# Patient Record
Sex: FEMALE | Race: WHITE | NOT HISPANIC OR LATINO | Employment: FULL TIME
[De-identification: names, ages, dates, MRNs, and addresses within clinical notes are randomized per-mention and may not be internally consistent; named-entity substitution may affect disease eponyms.]

---

## 2018-02-23 ENCOUNTER — HOSPITAL (OUTPATIENT)
Dept: OTHER | Age: 58
End: 2018-02-23
Attending: NURSE PRACTITIONER

## 2018-03-13 ENCOUNTER — HOSPITAL (OUTPATIENT)
Dept: OTHER | Age: 58
End: 2018-03-13
Attending: NURSE PRACTITIONER

## 2018-08-31 ENCOUNTER — HOSPITAL (OUTPATIENT)
Dept: OTHER | Age: 58
End: 2018-08-31
Attending: NURSE PRACTITIONER

## 2019-03-01 ENCOUNTER — HOSPITAL (OUTPATIENT)
Dept: OTHER | Age: 59
End: 2019-03-01
Attending: NURSE PRACTITIONER

## 2019-05-18 ENCOUNTER — HOSPITAL (OUTPATIENT)
Dept: OTHER | Age: 59
End: 2019-05-18

## 2019-05-21 LAB — PATHOLOGIST NAME: NORMAL

## 2019-09-11 ENCOUNTER — HOSPITAL (OUTPATIENT)
Dept: OTHER | Age: 59
End: 2019-09-11
Attending: FAMILY MEDICINE

## 2020-01-09 DIAGNOSIS — Z13.6 ENCOUNTER FOR SCREENING FOR CARDIOVASCULAR DISORDERS: Primary | ICD-10-CM

## 2020-01-09 DIAGNOSIS — Z13.6 ENCOUNTER FOR LIPID SCREENING FOR CARDIOVASCULAR DISEASE: ICD-10-CM

## 2020-01-09 DIAGNOSIS — Z13.220 ENCOUNTER FOR LIPID SCREENING FOR CARDIOVASCULAR DISEASE: ICD-10-CM

## 2020-01-18 DIAGNOSIS — R92.30 DENSE BREAST: Primary | ICD-10-CM

## 2020-01-18 DIAGNOSIS — Z12.31 VISIT FOR SCREENING MAMMOGRAM: ICD-10-CM

## 2020-01-18 DIAGNOSIS — R92.2 DENSE BREAST: Primary | ICD-10-CM

## 2020-01-28 ENCOUNTER — APPOINTMENT (OUTPATIENT)
Dept: CT IMAGING | Age: 60
End: 2020-01-28
Attending: FAMILY MEDICINE

## 2020-03-03 ENCOUNTER — HOSPITAL ENCOUNTER (OUTPATIENT)
Dept: ULTRASOUND IMAGING | Age: 60
Discharge: HOME | End: 2020-03-03

## 2020-03-03 ENCOUNTER — HOSPITAL ENCOUNTER (OUTPATIENT)
Dept: MAMMOGRAPHY | Age: 60
Discharge: HOME | End: 2020-03-03

## 2020-03-03 DIAGNOSIS — Z12.31 VISIT FOR SCREENING MAMMOGRAM: ICD-10-CM

## 2020-03-03 DIAGNOSIS — R92.30 DENSE BREAST: ICD-10-CM

## 2020-03-03 DIAGNOSIS — R92.2 DENSE BREAST: ICD-10-CM

## 2020-03-03 PROCEDURE — 76641 ULTRASOUND BREAST COMPLETE: CPT

## 2020-03-03 PROCEDURE — 77063 BREAST TOMOSYNTHESIS BI: CPT

## 2020-06-08 ENCOUNTER — APPOINTMENT (OUTPATIENT)
Dept: CT IMAGING | Age: 60
End: 2020-06-08
Attending: FAMILY MEDICINE

## 2021-01-07 DIAGNOSIS — R92.2 DENSE BREASTS: ICD-10-CM

## 2021-01-07 DIAGNOSIS — R92.30 DENSE BREASTS: ICD-10-CM

## 2021-01-07 DIAGNOSIS — Z12.31 ENCOUNTER FOR SCREENING MAMMOGRAM FOR MALIGNANT NEOPLASM OF BREAST: Primary | ICD-10-CM

## 2021-02-26 ENCOUNTER — HOSPITAL ENCOUNTER (OUTPATIENT)
Dept: MAMMOGRAPHY | Age: 61
Discharge: HOME | End: 2021-02-26
Attending: NURSE PRACTITIONER

## 2021-02-26 ENCOUNTER — HOSPITAL ENCOUNTER (OUTPATIENT)
Dept: ULTRASOUND IMAGING | Age: 61
Discharge: HOME | End: 2021-02-26
Attending: NURSE PRACTITIONER

## 2021-02-26 DIAGNOSIS — R92.2 DENSE BREASTS: ICD-10-CM

## 2021-02-26 DIAGNOSIS — Z12.31 ENCOUNTER FOR SCREENING MAMMOGRAM FOR MALIGNANT NEOPLASM OF BREAST: ICD-10-CM

## 2021-02-26 DIAGNOSIS — R92.30 DENSE BREASTS: ICD-10-CM

## 2021-02-26 PROCEDURE — 77063 BREAST TOMOSYNTHESIS BI: CPT

## 2021-02-26 PROCEDURE — 76641 ULTRASOUND BREAST COMPLETE: CPT

## 2021-11-08 ENCOUNTER — EXTERNAL RECORD (OUTPATIENT)
Dept: HEALTH INFORMATION MANAGEMENT | Facility: OTHER | Age: 61
End: 2021-11-08

## 2021-12-30 DIAGNOSIS — R92.2 DENSE BREASTS: Primary | ICD-10-CM

## 2021-12-30 DIAGNOSIS — Z12.31 BREAST CANCER SCREENING BY MAMMOGRAM: Primary | ICD-10-CM

## 2021-12-30 DIAGNOSIS — R92.30 DENSE BREASTS: Primary | ICD-10-CM

## 2022-02-25 ENCOUNTER — HOSPITAL ENCOUNTER (OUTPATIENT)
Dept: ULTRASOUND IMAGING | Age: 62
Discharge: HOME | End: 2022-02-25
Attending: NURSE PRACTITIONER

## 2022-02-25 ENCOUNTER — HOSPITAL ENCOUNTER (OUTPATIENT)
Dept: MAMMOGRAPHY | Age: 62
Discharge: HOME | End: 2022-02-25
Attending: NURSE PRACTITIONER

## 2022-02-25 DIAGNOSIS — R92.2 DENSE BREASTS: ICD-10-CM

## 2022-02-25 DIAGNOSIS — R92.30 DENSE BREASTS: ICD-10-CM

## 2022-02-25 DIAGNOSIS — Z12.31 BREAST CANCER SCREENING BY MAMMOGRAM: ICD-10-CM

## 2022-02-25 PROCEDURE — 76641 ULTRASOUND BREAST COMPLETE: CPT

## 2022-02-25 PROCEDURE — 77063 BREAST TOMOSYNTHESIS BI: CPT

## 2022-04-15 ENCOUNTER — TELEPHONE (OUTPATIENT)
Dept: SCHEDULING | Age: 62
End: 2022-04-15

## 2022-04-15 DIAGNOSIS — Z13.6 SCREENING FOR ISCHEMIC HEART DISEASE: Primary | ICD-10-CM

## 2022-07-06 ENCOUNTER — APPOINTMENT (OUTPATIENT)
Dept: CT IMAGING | Age: 62
End: 2022-07-06

## 2022-07-26 ENCOUNTER — OFFICE VISIT (OUTPATIENT)
Dept: OBGYN | Age: 62
End: 2022-07-26

## 2022-07-26 VITALS
SYSTOLIC BLOOD PRESSURE: 120 MMHG | DIASTOLIC BLOOD PRESSURE: 80 MMHG | HEIGHT: 60 IN | WEIGHT: 165 LBS | BODY MASS INDEX: 32.39 KG/M2

## 2022-07-26 DIAGNOSIS — Z01.419 GYNECOLOGIC EXAM NORMAL: Primary | ICD-10-CM

## 2022-07-26 PROCEDURE — 99396 PREV VISIT EST AGE 40-64: CPT | Performed by: NURSE PRACTITIONER

## 2022-07-26 PROCEDURE — 87624 HPV HI-RISK TYP POOLED RSLT: CPT | Performed by: INTERNAL MEDICINE

## 2022-07-26 PROCEDURE — 88175 CYTOPATH C/V AUTO FLUID REDO: CPT | Performed by: INTERNAL MEDICINE

## 2022-07-26 RX ORDER — ATORVASTATIN CALCIUM 10 MG/1
10 TABLET, FILM COATED ORAL DAILY
COMMUNITY
Start: 2010-01-01 | End: 2023-08-21 | Stop reason: DRUGHIGH

## 2022-07-26 RX ORDER — METFORMIN HYDROCHLORIDE 500 MG/1
500 TABLET, EXTENDED RELEASE ORAL DAILY
COMMUNITY
Start: 2022-07-09 | End: 2023-08-21 | Stop reason: SDUPTHER

## 2022-07-26 RX ORDER — LEVOTHYROXINE SODIUM 88 UG/1
TABLET ORAL
COMMUNITY
Start: 2022-07-02 | End: 2023-06-08

## 2022-07-26 RX ORDER — ATORVASTATIN CALCIUM 10 MG/1
10 TABLET, FILM COATED ORAL DAILY
COMMUNITY
Start: 2022-05-19 | End: 2022-07-26 | Stop reason: CLARIF

## 2022-07-26 RX ORDER — HYDROCHLOROTHIAZIDE 12.5 MG/1
CAPSULE, GELATIN COATED ORAL
COMMUNITY
Start: 2022-05-20 | End: 2023-08-21 | Stop reason: ALTCHOICE

## 2022-07-26 RX ORDER — VITAMIN B COMPLEX
TABLET ORAL DAILY
COMMUNITY
End: 2023-08-21 | Stop reason: ALTCHOICE

## 2022-07-26 ASSESSMENT — ENCOUNTER SYMPTOMS
ABDOMINAL PAIN: 0
EYES NEGATIVE: 1
ADENOPATHY: 0
ACTIVITY CHANGE: 0
CONSTIPATION: 0
DIARRHEA: 0
CHILLS: 0
APPETITE CHANGE: 0
DIAPHORESIS: 0
COUGH: 0
NAUSEA: 0
DIZZINESS: 0
FEVER: 0
COLOR CHANGE: 0
BLOOD IN STOOL: 0
UNEXPECTED WEIGHT CHANGE: 0
ABDOMINAL DISTENTION: 0
VOMITING: 0
HEADACHES: 0
LIGHT-HEADEDNESS: 0
SHORTNESS OF BREATH: 0
FATIGUE: 0
PSYCHIATRIC NEGATIVE: 1

## 2022-08-01 LAB
CASE RPRT: NORMAL
CLINICAL INFO: NORMAL
CYTOLOGY CVX/VAG STUDY: NORMAL
HPV16+18+45 E6+E7MRNA CVX NAA+PROBE: NEGATIVE
Lab: NORMAL
PAP EDUCATIONAL NOTE: NORMAL
SPECIMEN ADEQUACY: NORMAL

## 2023-03-06 ENCOUNTER — TELEPHONE (OUTPATIENT)
Dept: OBGYN | Age: 63
End: 2023-03-06

## 2023-03-06 DIAGNOSIS — Z12.31 ENCOUNTER FOR SCREENING MAMMOGRAM FOR MALIGNANT NEOPLASM OF BREAST: ICD-10-CM

## 2023-03-06 DIAGNOSIS — R92.30 DENSE BREASTS: ICD-10-CM

## 2023-03-06 DIAGNOSIS — Z12.39 BREAST SCREENING: Primary | ICD-10-CM

## 2023-03-06 DIAGNOSIS — R92.2 DENSE BREASTS: ICD-10-CM

## 2023-04-20 ENCOUNTER — HOSPITAL ENCOUNTER (OUTPATIENT)
Dept: MAMMOGRAPHY | Age: 63
Discharge: HOME | End: 2023-04-20
Attending: NURSE PRACTITIONER

## 2023-04-20 ENCOUNTER — HOSPITAL ENCOUNTER (OUTPATIENT)
Dept: ULTRASOUND IMAGING | Age: 63
Discharge: HOME | End: 2023-04-20
Attending: NURSE PRACTITIONER

## 2023-04-20 DIAGNOSIS — Z12.31 ENCOUNTER FOR SCREENING MAMMOGRAM FOR MALIGNANT NEOPLASM OF BREAST: ICD-10-CM

## 2023-04-20 DIAGNOSIS — Z12.39 BREAST SCREENING: ICD-10-CM

## 2023-04-20 DIAGNOSIS — R92.2 DENSE BREASTS: ICD-10-CM

## 2023-04-20 DIAGNOSIS — R92.30 DENSE BREASTS: ICD-10-CM

## 2023-04-20 PROCEDURE — 77063 BREAST TOMOSYNTHESIS BI: CPT

## 2023-04-20 PROCEDURE — 76641 ULTRASOUND BREAST COMPLETE: CPT

## 2023-04-21 ENCOUNTER — TELEPHONE (OUTPATIENT)
Dept: OBGYN | Age: 63
End: 2023-04-21

## 2023-06-19 ENCOUNTER — ANESTHESIA EVENT (OUTPATIENT)
Dept: GASTROENTEROLOGY | Age: 63
End: 2023-06-19

## 2023-06-19 ENCOUNTER — HOSPITAL ENCOUNTER (OUTPATIENT)
Dept: GASTROENTEROLOGY | Age: 63
Discharge: HOME | End: 2023-06-19
Attending: INTERNAL MEDICINE

## 2023-06-19 ENCOUNTER — ANESTHESIA (OUTPATIENT)
Dept: GASTROENTEROLOGY | Age: 63
End: 2023-06-19

## 2023-06-19 VITALS
WEIGHT: 162 LBS | RESPIRATION RATE: 17 BRPM | TEMPERATURE: 97.5 F | BODY MASS INDEX: 31.8 KG/M2 | HEIGHT: 60 IN | OXYGEN SATURATION: 99 % | SYSTOLIC BLOOD PRESSURE: 120 MMHG | DIASTOLIC BLOOD PRESSURE: 73 MMHG | HEART RATE: 75 BPM

## 2023-06-19 DIAGNOSIS — Z86.010 PERSONAL HISTORY OF COLONIC POLYPS: ICD-10-CM

## 2023-06-19 LAB — GLUCOSE BLDC GLUCOMTR-MCNC: 103 MG/DL (ref 70–99)

## 2023-06-19 PROCEDURE — 10002800 HB RX 250 W HCPCS: Performed by: ANESTHESIOLOGY

## 2023-06-19 PROCEDURE — 10002801 HB RX 250 W/O HCPCS: Performed by: ANESTHESIOLOGY

## 2023-06-19 PROCEDURE — 10002807 HB RX 258: Performed by: ANESTHESIOLOGY

## 2023-06-19 PROCEDURE — 13000001 HB PHASE II RECOVERY EA 30 MINUTES

## 2023-06-19 PROCEDURE — 13000004 HB  ANESTHESIA  GENERAL OUTSIDE OR

## 2023-06-19 PROCEDURE — 82962 GLUCOSE BLOOD TEST: CPT

## 2023-06-19 PROCEDURE — 13000024 HB GI COMPLEX CASE S/U + 1ST 15 MIN

## 2023-06-19 PROCEDURE — 10002807 HB RX 258: Performed by: INTERNAL MEDICINE

## 2023-06-19 RX ORDER — DEXTROSE AND SODIUM CHLORIDE 5; .45 G/100ML; G/100ML
INJECTION, SOLUTION INTRAVENOUS CONTINUOUS
Status: DISCONTINUED | OUTPATIENT
Start: 2023-06-19 | End: 2023-06-19 | Stop reason: HOSPADM

## 2023-06-19 RX ORDER — SODIUM CHLORIDE, SODIUM LACTATE, POTASSIUM CHLORIDE, CALCIUM CHLORIDE 600; 310; 30; 20 MG/100ML; MG/100ML; MG/100ML; MG/100ML
INJECTION, SOLUTION INTRAVENOUS CONTINUOUS
Status: DISCONTINUED | OUTPATIENT
Start: 2023-06-19 | End: 2023-06-19 | Stop reason: HOSPADM

## 2023-06-19 RX ORDER — SODIUM CHLORIDE, SODIUM LACTATE, POTASSIUM CHLORIDE, CALCIUM CHLORIDE 600; 310; 30; 20 MG/100ML; MG/100ML; MG/100ML; MG/100ML
INJECTION, SOLUTION INTRAVENOUS CONTINUOUS PRN
Status: DISCONTINUED | OUTPATIENT
Start: 2023-06-19 | End: 2023-06-19

## 2023-06-19 RX ORDER — PROPOFOL 10 MG/ML
INJECTION, EMULSION INTRAVENOUS PRN
Status: DISCONTINUED | OUTPATIENT
Start: 2023-06-19 | End: 2023-06-19

## 2023-06-19 RX ORDER — LIDOCAINE HYDROCHLORIDE 10 MG/ML
INJECTION, SOLUTION EPIDURAL; INFILTRATION; INTRACAUDAL; PERINEURAL PRN
Status: DISCONTINUED | OUTPATIENT
Start: 2023-06-19 | End: 2023-06-19

## 2023-06-19 RX ORDER — LEVOTHYROXINE SODIUM 0.07 MG/1
75 TABLET ORAL
Status: SHIPPED | COMMUNITY
Start: 2023-06-19 | End: 2023-08-21 | Stop reason: SDUPTHER

## 2023-06-19 RX ORDER — SODIUM CHLORIDE, SODIUM LACTATE, POTASSIUM CHLORIDE, CALCIUM CHLORIDE 600; 310; 30; 20 MG/100ML; MG/100ML; MG/100ML; MG/100ML
INJECTION, SOLUTION INTRAVENOUS ONCE
Status: COMPLETED | OUTPATIENT
Start: 2023-06-19 | End: 2023-06-19

## 2023-06-19 RX ORDER — DEXTROSE MONOHYDRATE 25 G/50ML
25 INJECTION, SOLUTION INTRAVENOUS PRN
Status: DISCONTINUED | OUTPATIENT
Start: 2023-06-19 | End: 2023-06-19 | Stop reason: HOSPADM

## 2023-06-19 RX ADMIN — SODIUM CHLORIDE, POTASSIUM CHLORIDE, SODIUM LACTATE AND CALCIUM CHLORIDE: 600; 310; 30; 20 INJECTION, SOLUTION INTRAVENOUS at 09:25

## 2023-06-19 RX ADMIN — PROPOFOL 150 MG: 10 INJECTION, EMULSION INTRAVENOUS at 09:58

## 2023-06-19 RX ADMIN — LIDOCAINE HYDROCHLORIDE 25 MG: 10 INJECTION, SOLUTION EPIDURAL; INFILTRATION; INTRACAUDAL; PERINEURAL at 09:58

## 2023-06-19 RX ADMIN — PROPOFOL 140 MCG/KG/MIN: 10 INJECTION, EMULSION INTRAVENOUS at 09:58

## 2023-06-19 RX ADMIN — SODIUM CHLORIDE, POTASSIUM CHLORIDE, SODIUM LACTATE AND CALCIUM CHLORIDE: 600; 310; 30; 20 INJECTION, SOLUTION INTRAVENOUS at 09:49

## 2023-06-19 ASSESSMENT — PAIN SCALES - GENERAL
PAINLEVEL_OUTOF10: 0

## 2023-08-21 PROBLEM — Z72.0 TOBACCO USE: Status: ACTIVE | Noted: 2023-08-21

## 2023-08-21 PROBLEM — R73.01 IMPAIRED FASTING GLUCOSE: Status: ACTIVE | Noted: 2019-04-15

## 2023-08-25 ENCOUNTER — OFFICE VISIT (OUTPATIENT)
Dept: OBGYN | Age: 63
End: 2023-08-25

## 2023-08-25 VITALS
HEIGHT: 61 IN | WEIGHT: 165 LBS | BODY MASS INDEX: 31.15 KG/M2 | DIASTOLIC BLOOD PRESSURE: 80 MMHG | SYSTOLIC BLOOD PRESSURE: 150 MMHG

## 2023-08-25 DIAGNOSIS — Z01.419 GYNECOLOGIC EXAM NORMAL: Primary | ICD-10-CM

## 2023-08-25 PROCEDURE — 88175 CYTOPATH C/V AUTO FLUID REDO: CPT | Performed by: INTERNAL MEDICINE

## 2023-08-25 PROCEDURE — 3079F DIAST BP 80-89 MM HG: CPT | Performed by: NURSE PRACTITIONER

## 2023-08-25 PROCEDURE — 3077F SYST BP >= 140 MM HG: CPT | Performed by: NURSE PRACTITIONER

## 2023-08-25 PROCEDURE — 87624 HPV HI-RISK TYP POOLED RSLT: CPT | Performed by: INTERNAL MEDICINE

## 2023-08-25 PROCEDURE — 99396 PREV VISIT EST AGE 40-64: CPT | Performed by: NURSE PRACTITIONER

## 2023-08-25 ASSESSMENT — ENCOUNTER SYMPTOMS
CHILLS: 0
COLOR CHANGE: 0
VOMITING: 0
SHORTNESS OF BREATH: 0
FEVER: 0
ACTIVITY CHANGE: 0
DIAPHORESIS: 0
DIZZINESS: 0
NAUSEA: 0
HEADACHES: 0
EYES NEGATIVE: 1
UNEXPECTED WEIGHT CHANGE: 0
PSYCHIATRIC NEGATIVE: 1
ABDOMINAL PAIN: 0
APPETITE CHANGE: 0
CONSTIPATION: 0
ABDOMINAL DISTENTION: 0
FATIGUE: 0
ADENOPATHY: 0
COUGH: 0
BLOOD IN STOOL: 0
DIARRHEA: 0
LIGHT-HEADEDNESS: 0

## 2024-02-21 ENCOUNTER — HOSPITAL ENCOUNTER (OUTPATIENT)
Dept: ULTRASOUND IMAGING | Age: 64
Discharge: HOME | End: 2024-02-21
Attending: FAMILY MEDICINE

## 2024-02-21 DIAGNOSIS — R10.9 ABDOMINAL PAIN, UNSPECIFIED ABDOMINAL LOCATION: ICD-10-CM

## 2024-02-21 PROBLEM — R11.14 BILIOUS VOMITING WITHOUT NAUSEA: Status: ACTIVE | Noted: 2024-02-21

## 2024-02-21 PROBLEM — R10.30 LOWER ABDOMINAL PAIN: Status: ACTIVE | Noted: 2024-02-21

## 2024-02-21 PROCEDURE — 76700 US EXAM ABDOM COMPLETE: CPT

## 2024-03-12 PROBLEM — R11.14 BILIOUS VOMITING WITHOUT NAUSEA: Status: RESOLVED | Noted: 2024-02-21 | Resolved: 2024-03-12

## 2024-03-21 ENCOUNTER — TELEPHONE (OUTPATIENT)
Dept: OBGYN | Age: 64
End: 2024-03-21

## 2024-03-21 DIAGNOSIS — Z12.31 ENCOUNTER FOR SCREENING MAMMOGRAM FOR MALIGNANT NEOPLASM OF BREAST: Primary | ICD-10-CM

## 2024-03-21 DIAGNOSIS — Z12.39 ENCOUNTER FOR BREAST CANCER SCREENING USING NON-MAMMOGRAM MODALITY: ICD-10-CM

## 2024-03-21 DIAGNOSIS — R92.30 DENSE BREASTS: ICD-10-CM

## 2024-03-21 DIAGNOSIS — R92.2 DENSE BREASTS: ICD-10-CM

## 2024-05-07 ENCOUNTER — HOSPITAL ENCOUNTER (OUTPATIENT)
Dept: MAMMOGRAPHY | Age: 64
Discharge: HOME | End: 2024-05-07
Attending: NURSE PRACTITIONER

## 2024-05-07 ENCOUNTER — HOSPITAL ENCOUNTER (OUTPATIENT)
Dept: ULTRASOUND IMAGING | Age: 64
Discharge: HOME | End: 2024-05-07
Attending: NURSE PRACTITIONER

## 2024-05-07 DIAGNOSIS — R92.30 DENSE BREASTS: ICD-10-CM

## 2024-05-07 DIAGNOSIS — Z12.31 ENCOUNTER FOR SCREENING MAMMOGRAM FOR MALIGNANT NEOPLASM OF BREAST: ICD-10-CM

## 2024-05-07 DIAGNOSIS — Z12.39 ENCOUNTER FOR BREAST CANCER SCREENING USING NON-MAMMOGRAM MODALITY: ICD-10-CM

## 2024-05-07 PROCEDURE — 76641 ULTRASOUND BREAST COMPLETE: CPT

## 2024-05-07 PROCEDURE — 77067 SCR MAMMO BI INCL CAD: CPT

## 2024-05-07 PROCEDURE — 77063 BREAST TOMOSYNTHESIS BI: CPT

## 2024-08-19 ENCOUNTER — TELEPHONE (OUTPATIENT)
Dept: ONCOLOGY | Age: 64
End: 2024-08-19

## 2024-08-27 ENCOUNTER — APPOINTMENT (OUTPATIENT)
Dept: OBGYN | Age: 64
End: 2024-08-27

## 2024-08-27 VITALS
BODY MASS INDEX: 30.21 KG/M2 | SYSTOLIC BLOOD PRESSURE: 130 MMHG | DIASTOLIC BLOOD PRESSURE: 70 MMHG | WEIGHT: 160 LBS | HEIGHT: 61 IN

## 2024-08-27 DIAGNOSIS — Z01.419 GYNECOLOGIC EXAM NORMAL: Primary | ICD-10-CM

## 2024-08-27 PROCEDURE — 3075F SYST BP GE 130 - 139MM HG: CPT | Performed by: NURSE PRACTITIONER

## 2024-08-27 PROCEDURE — 99396 PREV VISIT EST AGE 40-64: CPT | Performed by: NURSE PRACTITIONER

## 2024-08-27 PROCEDURE — 3078F DIAST BP <80 MM HG: CPT | Performed by: NURSE PRACTITIONER

## 2024-08-27 ASSESSMENT — ENCOUNTER SYMPTOMS
FEVER: 0
COLOR CHANGE: 0
FATIGUE: 0
COUGH: 0
ABDOMINAL DISTENTION: 0
APPETITE CHANGE: 0
PSYCHIATRIC NEGATIVE: 1
ADENOPATHY: 0
CONSTIPATION: 0
NAUSEA: 0
ABDOMINAL PAIN: 0
EYES NEGATIVE: 1
CHILLS: 0
SHORTNESS OF BREATH: 0
ACTIVITY CHANGE: 0
VOMITING: 0
UNEXPECTED WEIGHT CHANGE: 0
DIAPHORESIS: 0
BLOOD IN STOOL: 0
HEADACHES: 0
LIGHT-HEADEDNESS: 0
DIZZINESS: 0
DIARRHEA: 0

## 2024-08-28 ENCOUNTER — HOSPITAL ENCOUNTER (OUTPATIENT)
Dept: CT IMAGING | Age: 64
Discharge: HOME | End: 2024-08-28
Attending: FAMILY MEDICINE

## 2024-08-28 DIAGNOSIS — F17.200 SMOKER: ICD-10-CM

## 2024-08-28 PROCEDURE — 71271 CT THORAX LUNG CANCER SCR C-: CPT

## 2024-09-03 PROBLEM — Z00.00 GENERAL MEDICAL EXAM: Status: ACTIVE | Noted: 2024-09-03

## 2024-11-12 PROBLEM — Z82.49 FAMILY HX OF AORTIC ANEURYSM: Status: ACTIVE | Noted: 2024-11-12

## 2024-11-12 PROBLEM — D49.2 NEOPLASM OF SKIN: Status: ACTIVE | Noted: 2024-11-12

## 2024-11-12 PROBLEM — S46.911A STRAIN OF RIGHT SHOULDER: Status: ACTIVE | Noted: 2024-11-12

## 2024-11-26 PROBLEM — M62.830 SPASM OF LEFT TRAPEZIUS MUSCLE: Status: ACTIVE | Noted: 2024-11-26

## 2024-11-26 PROBLEM — S46.912A STRAIN OF LEFT SHOULDER: Status: ACTIVE | Noted: 2024-11-12

## 2024-12-03 ENCOUNTER — APPOINTMENT (OUTPATIENT)
Dept: CT IMAGING | Age: 64
End: 2024-12-03
Attending: FAMILY MEDICINE

## 2024-12-03 DIAGNOSIS — Z13.6 SCREENING FOR CARDIOVASCULAR CONDITION: ICD-10-CM

## 2024-12-03 PROCEDURE — 75571 CT HRT W/O DYE W/CA TEST: CPT

## 2024-12-06 ENCOUNTER — APPOINTMENT (OUTPATIENT)
Dept: URBAN - METROPOLITAN AREA CLINIC 240 | Age: 64
Setting detail: DERMATOLOGY
End: 2024-12-06

## 2024-12-06 DIAGNOSIS — L82.1 OTHER SEBORRHEIC KERATOSIS: ICD-10-CM

## 2024-12-06 DIAGNOSIS — D49.2 NEOPLASM OF UNSPECIFIED BEHAVIOR OF BONE, SOFT TISSUE, AND SKIN: ICD-10-CM

## 2024-12-06 DIAGNOSIS — D22 MELANOCYTIC NEVI: ICD-10-CM

## 2024-12-06 DIAGNOSIS — D18.0 HEMANGIOMA: ICD-10-CM

## 2024-12-06 DIAGNOSIS — L81.4 OTHER MELANIN HYPERPIGMENTATION: ICD-10-CM

## 2024-12-06 DIAGNOSIS — Z71.89 OTHER SPECIFIED COUNSELING: ICD-10-CM

## 2024-12-06 PROBLEM — D22.5 MELANOCYTIC NEVI OF TRUNK: Status: ACTIVE | Noted: 2024-12-06

## 2024-12-06 PROBLEM — D22.71 MELANOCYTIC NEVI OF RIGHT LOWER LIMB, INCLUDING HIP: Status: ACTIVE | Noted: 2024-12-06

## 2024-12-06 PROBLEM — D18.01 HEMANGIOMA OF SKIN AND SUBCUTANEOUS TISSUE: Status: ACTIVE | Noted: 2024-12-06

## 2024-12-06 PROCEDURE — OTHER MIPS QUALITY: OTHER

## 2024-12-06 PROCEDURE — OTHER PHOTO-DOCUMENTATION: OTHER

## 2024-12-06 PROCEDURE — 99203 OFFICE O/P NEW LOW 30 MIN: CPT | Mod: 25

## 2024-12-06 PROCEDURE — OTHER BIOPSY BY SHAVE METHOD: OTHER

## 2024-12-06 PROCEDURE — 11102 TANGNTL BX SKIN SINGLE LES: CPT

## 2024-12-06 PROCEDURE — OTHER COUNSELING: OTHER

## 2024-12-06 ASSESSMENT — LOCATION DETAILED DESCRIPTION DERM
LOCATION DETAILED: LEFT SUPERIOR UPPER BACK
LOCATION DETAILED: RIGHT LATERAL SUPERIOR CHEST
LOCATION DETAILED: SUPERIOR LUMBAR SPINE
LOCATION DETAILED: RIGHT LATERAL ABDOMEN
LOCATION DETAILED: LEFT MEDIAL BREAST 10-11:00 REGION
LOCATION DETAILED: RIGHT MID-UPPER BACK
LOCATION DETAILED: LEFT LATERAL ABDOMEN
LOCATION DETAILED: EPIGASTRIC SKIN
LOCATION DETAILED: RIGHT ANTERIOR DISTAL THIGH

## 2024-12-06 ASSESSMENT — LOCATION SIMPLE DESCRIPTION DERM
LOCATION SIMPLE: CHEST
LOCATION SIMPLE: LEFT BREAST
LOCATION SIMPLE: LOWER BACK
LOCATION SIMPLE: RIGHT UPPER BACK
LOCATION SIMPLE: LEFT UPPER BACK
LOCATION SIMPLE: ABDOMEN
LOCATION SIMPLE: RIGHT THIGH

## 2024-12-06 ASSESSMENT — LOCATION ZONE DERM
LOCATION ZONE: TRUNK
LOCATION ZONE: LEG

## 2024-12-06 NOTE — PROCEDURE: BIOPSY BY SHAVE METHOD
Detail Level: Detailed
Depth Of Biopsy: dermis
Was A Bandage Applied: Yes
Size Of Lesion In Cm: 0.4
X Size Of Lesion In Cm: 0
Biopsy Type: H and E
Biopsy Method: Dermablade
Anesthesia Type: 1% lidocaine with epinephrine
Anesthesia Volume In Cc: 0.5
Hemostasis: Drysol
Wound Care: Petrolatum
Dressing: bandage
Destruction After The Procedure: No
Type Of Destruction Used: Curettage
Curettage Text: The wound bed was treated with curettage after the biopsy was performed.
Cryotherapy Text: The wound bed was treated with cryotherapy after the biopsy was performed.
Electrodesiccation Text: The wound bed was treated with electrodesiccation after the biopsy was performed.
Electrodesiccation And Curettage Text: The wound bed was treated with electrodesiccation and curettage after the biopsy was performed.
Silver Nitrate Text: The wound bed was treated with silver nitrate after the biopsy was performed.
Lab: [id number]
Medical Necessity Information: It is in your best interest to select a reason for this procedure from the list below. All of these items fulfill various CMS LCD requirements except the new and changing color options.
Consent: Written consent was obtained and risks were reviewed including but not limited to scarring, infection, bleeding, scabbing, incomplete removal, nerve damage and allergy to anesthesia.
Post-Care Instructions: I reviewed with the patient in detail post-care instructions. Patient is to keep the biopsy site dry overnight, and then apply bacitracin twice daily until healed. Patient may apply hydrogen peroxide soaks to remove any crusting.
Notification Instructions: Patient will be notified of biopsy results. However, patient instructed to call the office if not contacted within 2 weeks.
Billing Type: Third-Party Bill
Information: Selecting Yes will display possible errors in your note based on the variables you have selected. This validation is only offered as a suggestion for you. PLEASE NOTE THAT THE VALIDATION TEXT WILL BE REMOVED WHEN YOU FINALIZE YOUR NOTE. IF YOU WANT TO FAX A PRELIMINARY NOTE YOU WILL NEED TO TOGGLE THIS TO 'NO' IF YOU DO NOT WANT IT IN YOUR FAXED NOTE.

## 2024-12-06 NOTE — HPI: FULL BODY SKIN EXAMINATION
What Type Of Note Output Would You Prefer (Optional)?: Bullet Format
What Is The Reason For Today's Visit?: Full Body Skin Examination
What Is The Reason For Today's Visit? (Being Monitored For X): concerning skin lesions on an annual basis
Additional History: Reports mole on L chest getting scratched and currently is enlarging. Would like evaluation. Hx of mole bx, benign.

## 2024-12-18 ENCOUNTER — APPOINTMENT (OUTPATIENT)
Dept: ULTRASOUND IMAGING | Age: 64
End: 2024-12-18

## 2025-03-20 PROBLEM — I25.83 CORONARY ARTERY DISEASE DUE TO LIPID RICH PLAQUE: Status: ACTIVE | Noted: 2025-03-20

## 2025-03-20 PROBLEM — I25.10 CORONARY ARTERY DISEASE DUE TO LIPID RICH PLAQUE: Status: ACTIVE | Noted: 2025-03-20

## 2025-04-08 ENCOUNTER — APPOINTMENT (OUTPATIENT)
Dept: CARDIOLOGY | Age: 65
End: 2025-04-08
Attending: FAMILY MEDICINE

## 2025-04-08 VITALS
WEIGHT: 159.28 LBS | SYSTOLIC BLOOD PRESSURE: 122 MMHG | HEART RATE: 66 BPM | HEIGHT: 61 IN | BODY MASS INDEX: 30.07 KG/M2 | DIASTOLIC BLOOD PRESSURE: 74 MMHG

## 2025-04-08 DIAGNOSIS — I10 BENIGN ESSENTIAL HYPERTENSION: ICD-10-CM

## 2025-04-08 DIAGNOSIS — I25.10 CORONARY ARTERY DISEASE DUE TO LIPID RICH PLAQUE: ICD-10-CM

## 2025-04-08 DIAGNOSIS — I25.83 CORONARY ARTERY DISEASE DUE TO LIPID RICH PLAQUE: ICD-10-CM

## 2025-04-08 DIAGNOSIS — Z82.49 FAMILY HX OF AORTIC ANEURYSM: ICD-10-CM

## 2025-04-08 DIAGNOSIS — E78.00 PURE HYPERCHOLESTEROLEMIA: ICD-10-CM

## 2025-04-08 DIAGNOSIS — Z72.0 TOBACCO USE: ICD-10-CM

## 2025-04-08 DIAGNOSIS — R94.31 ABNORMAL ELECTROCARDIOGRAM (ECG) (EKG): Primary | ICD-10-CM

## 2025-04-08 LAB
ATRIAL RATE (BPM): 66
P AXIS (DEGREES): 43
PR-INTERVAL (MSEC): 172
QRS-INTERVAL (MSEC): 78
QT-INTERVAL (MSEC): 376
QTC: 394
R AXIS (DEGREES): 0
REPORT TEXT: NORMAL
T AXIS (DEGREES): 63
VENTRICULAR RATE EKG/MIN (BPM): 66

## 2025-04-08 RX ORDER — EZETIMIBE 10 MG/1
10 TABLET ORAL DAILY
Qty: 30 TABLET | Refills: 3 | Status: SHIPPED | OUTPATIENT
Start: 2025-04-08

## 2025-04-08 ASSESSMENT — PATIENT HEALTH QUESTIONNAIRE - PHQ9
CLINICAL INTERPRETATION OF PHQ2 SCORE: NO FURTHER SCREENING NEEDED
1. LITTLE INTEREST OR PLEASURE IN DOING THINGS: NOT AT ALL
SUM OF ALL RESPONSES TO PHQ9 QUESTIONS 1 AND 2: 0
2. FEELING DOWN, DEPRESSED OR HOPELESS: NOT AT ALL
SUM OF ALL RESPONSES TO PHQ9 QUESTIONS 1 AND 2: 0

## 2025-04-08 ASSESSMENT — ENCOUNTER SYMPTOMS
ALLERGIC/IMMUNOLOGIC NEGATIVE: 1
RESPIRATORY NEGATIVE: 1
NEUROLOGICAL NEGATIVE: 1
SHORTNESS OF BREATH: 0
ENDOCRINE NEGATIVE: 1
GASTROINTESTINAL NEGATIVE: 1
CONSTITUTIONAL NEGATIVE: 1
HEMATOLOGIC/LYMPHATIC NEGATIVE: 1
EYES NEGATIVE: 1
PSYCHIATRIC NEGATIVE: 1
ORTHOPNEA: 0

## 2025-05-05 DIAGNOSIS — Z12.31 VISIT FOR SCREENING MAMMOGRAM: Primary | ICD-10-CM

## 2025-05-13 ENCOUNTER — HOSPITAL ENCOUNTER (OUTPATIENT)
Dept: NUCLEAR MEDICINE | Age: 65
Discharge: HOME | End: 2025-05-13
Attending: INTERNAL MEDICINE

## 2025-05-13 ENCOUNTER — HOSPITAL ENCOUNTER (OUTPATIENT)
Dept: CARDIOLOGY | Age: 65
Discharge: HOME | End: 2025-05-13
Attending: INTERNAL MEDICINE

## 2025-05-13 ENCOUNTER — RESULTS FOLLOW-UP (OUTPATIENT)
Dept: CARDIOLOGY | Age: 65
End: 2025-05-13

## 2025-05-13 VITALS — HEART RATE: 93 BPM | DIASTOLIC BLOOD PRESSURE: 80 MMHG | SYSTOLIC BLOOD PRESSURE: 134 MMHG

## 2025-05-13 DIAGNOSIS — I25.83 CORONARY ARTERY DISEASE DUE TO LIPID RICH PLAQUE: ICD-10-CM

## 2025-05-13 DIAGNOSIS — I25.10 CORONARY ARTERY DISEASE DUE TO LIPID RICH PLAQUE: ICD-10-CM

## 2025-05-13 DIAGNOSIS — R94.31 ABNORMAL ELECTROCARDIOGRAM (ECG) (EKG): ICD-10-CM

## 2025-05-13 PROCEDURE — 78452 HT MUSCLE IMAGE SPECT MULT: CPT

## 2025-05-13 PROCEDURE — 93018 CV STRESS TEST I&R ONLY: CPT | Performed by: INTERNAL MEDICINE

## 2025-05-13 PROCEDURE — 93017 CV STRESS TEST TRACING ONLY: CPT

## 2025-05-13 PROCEDURE — 93016 CV STRESS TEST SUPVJ ONLY: CPT | Performed by: INTERNAL MEDICINE

## 2025-05-13 PROCEDURE — 10006150 HB RX 343: Performed by: INTERNAL MEDICINE

## 2025-05-13 PROCEDURE — A9502 TC99M TETROFOSMIN: HCPCS | Performed by: INTERNAL MEDICINE

## 2025-05-13 RX ADMIN — TETROFOSMIN 7.5 MILLICURIE: 1.38 INJECTION, POWDER, LYOPHILIZED, FOR SOLUTION INTRAVENOUS at 08:00

## 2025-05-13 RX ADMIN — TETROFOSMIN 24 MILLICURIE: 1.38 INJECTION, POWDER, LYOPHILIZED, FOR SOLUTION INTRAVENOUS at 08:50

## 2025-05-20 ENCOUNTER — HOSPITAL ENCOUNTER (OUTPATIENT)
Dept: MAMMOGRAPHY | Age: 65
Discharge: HOME | End: 2025-05-20
Attending: FAMILY MEDICINE

## 2025-05-20 DIAGNOSIS — Z12.31 VISIT FOR SCREENING MAMMOGRAM: ICD-10-CM

## 2025-05-20 PROCEDURE — 77067 SCR MAMMO BI INCL CAD: CPT

## 2025-08-07 RX ORDER — EZETIMIBE 10 MG/1
10 TABLET ORAL DAILY
Qty: 90 TABLET | Refills: 1 | Status: SHIPPED | OUTPATIENT
Start: 2025-08-07

## 2025-08-13 ENCOUNTER — TELEPHONE (OUTPATIENT)
Age: 65
End: 2025-08-13

## 2025-10-07 ENCOUNTER — APPOINTMENT (OUTPATIENT)
Dept: CARDIOLOGY | Age: 65
End: 2025-10-07

## 2025-10-30 ENCOUNTER — APPOINTMENT (OUTPATIENT)
Dept: CARDIOLOGY | Age: 65
End: 2025-10-30

## 2025-10-31 ENCOUNTER — APPOINTMENT (OUTPATIENT)
Dept: OBGYN | Age: 65
End: 2025-10-31